# Patient Record
Sex: FEMALE | Race: WHITE | NOT HISPANIC OR LATINO | Employment: OTHER | URBAN - METROPOLITAN AREA
[De-identification: names, ages, dates, MRNs, and addresses within clinical notes are randomized per-mention and may not be internally consistent; named-entity substitution may affect disease eponyms.]

---

## 2023-06-06 DIAGNOSIS — M81.0 OSTEOPOROSIS, SENILE: Primary | ICD-10-CM

## 2023-06-07 PROBLEM — R19.5 POSITIVE COLORECTAL CANCER SCREENING USING COLOGUARD TEST: Status: ACTIVE | Noted: 2023-06-07

## 2023-06-07 PROBLEM — M81.0 OSTEOPOROSIS, SENILE: Status: ACTIVE | Noted: 2023-06-07

## 2023-06-07 PROBLEM — N39.0 UTI (URINARY TRACT INFECTION): Status: RESOLVED | Noted: 2023-06-07 | Resolved: 2023-06-07

## 2023-06-07 PROBLEM — R92.8 ABNORMAL MAMMOGRAM OF RIGHT BREAST: Status: ACTIVE | Noted: 2023-06-07

## 2023-06-07 PROBLEM — R60.0 EDEMA OF BOTH LEGS: Status: ACTIVE | Noted: 2023-06-07

## 2023-06-07 PROBLEM — F17.210 HEAVY TOBACCO SMOKER >10 CIGARETTES PER DAY: Status: ACTIVE | Noted: 2023-06-07

## 2023-06-07 PROBLEM — R31.29 MICROSCOPIC HEMATURIA: Status: ACTIVE | Noted: 2023-06-07

## 2023-06-07 RX ORDER — IBANDRONATE SODIUM 150 MG/1
TABLET, FILM COATED ORAL
Qty: 3 TABLET | Refills: 3 | Status: SHIPPED | OUTPATIENT
Start: 2023-06-07 | End: 2023-06-09

## 2023-06-09 DIAGNOSIS — M81.0 OSTEOPOROSIS, SENILE: ICD-10-CM

## 2023-06-09 RX ORDER — IBANDRONATE SODIUM 150 MG/1
TABLET, FILM COATED ORAL
Qty: 3 TABLET | Refills: 3 | Status: SHIPPED | OUTPATIENT
Start: 2023-06-09 | End: 2024-05-03 | Stop reason: SDUPTHER

## 2023-12-18 ENCOUNTER — HOSPITAL ENCOUNTER (OUTPATIENT)
Dept: RADIOLOGY | Facility: HOSPITAL | Age: 68
Discharge: HOME | End: 2023-12-18
Payer: MEDICARE

## 2023-12-18 DIAGNOSIS — J84.9 INTERSTITIAL PULMONARY DISEASE, UNSPECIFIED (MULTI): ICD-10-CM

## 2023-12-18 PROCEDURE — 71250 CT THORAX DX C-: CPT

## 2023-12-18 PROCEDURE — 71250 CT THORAX DX C-: CPT | Performed by: RADIOLOGY

## 2024-01-05 RX ORDER — NITROFURANTOIN 25; 75 MG/1; MG/1
100 CAPSULE ORAL EVERY 12 HOURS
COMMUNITY
Start: 2023-01-09

## 2024-01-05 RX ORDER — ACETAMINOPHEN 500 MG
TABLET ORAL
COMMUNITY

## 2024-01-05 RX ORDER — IBANDRONATE SODIUM 150 MG/1
TABLET, FILM COATED ORAL
COMMUNITY
Start: 2022-05-27 | End: 2024-05-03 | Stop reason: SDUPTHER

## 2024-01-08 ENCOUNTER — OFFICE VISIT (OUTPATIENT)
Dept: PULMONOLOGY | Facility: CLINIC | Age: 69
End: 2024-01-08
Payer: MEDICARE

## 2024-01-08 VITALS
DIASTOLIC BLOOD PRESSURE: 74 MMHG | RESPIRATION RATE: 16 BRPM | SYSTOLIC BLOOD PRESSURE: 121 MMHG | OXYGEN SATURATION: 99 % | HEART RATE: 68 BPM | WEIGHT: 137 LBS | BODY MASS INDEX: 25.89 KG/M2

## 2024-01-08 DIAGNOSIS — F17.210 CIGARETTE SMOKER: ICD-10-CM

## 2024-01-08 DIAGNOSIS — J84.10 PULMONARY FIBROSIS (MULTI): ICD-10-CM

## 2024-01-08 DIAGNOSIS — R91.8 LUNG NODULES: ICD-10-CM

## 2024-01-08 DIAGNOSIS — F17.200 SMOKING: Primary | ICD-10-CM

## 2024-01-08 PROCEDURE — 99214 OFFICE O/P EST MOD 30 MIN: CPT | Performed by: INTERNAL MEDICINE

## 2024-01-08 PROCEDURE — 1126F AMNT PAIN NOTED NONE PRSNT: CPT | Performed by: INTERNAL MEDICINE

## 2024-01-08 PROCEDURE — 1159F MED LIST DOCD IN RCRD: CPT | Performed by: INTERNAL MEDICINE

## 2024-01-08 RX ORDER — VARENICLINE TARTRATE 0.5 (11)-1
KIT ORAL
Qty: 53 EACH | Refills: 0 | Status: SHIPPED | OUTPATIENT
Start: 2024-01-08 | End: 2024-03-14

## 2024-01-08 ASSESSMENT — PATIENT HEALTH QUESTIONNAIRE - PHQ9
SUM OF ALL RESPONSES TO PHQ9 QUESTIONS 1 AND 2: 0
1. LITTLE INTEREST OR PLEASURE IN DOING THINGS: NOT AT ALL
2. FEELING DOWN, DEPRESSED OR HOPELESS: NOT AT ALL

## 2024-01-08 ASSESSMENT — COLUMBIA-SUICIDE SEVERITY RATING SCALE - C-SSRS
2. HAVE YOU ACTUALLY HAD ANY THOUGHTS OF KILLING YOURSELF?: NO
6. HAVE YOU EVER DONE ANYTHING, STARTED TO DO ANYTHING, OR PREPARED TO DO ANYTHING TO END YOUR LIFE?: NO

## 2024-01-08 ASSESSMENT — ENCOUNTER SYMPTOMS: DEPRESSION: 0

## 2024-01-08 ASSESSMENT — PAIN SCALES - GENERAL: PAINLEVEL: 0-NO PAIN

## 2024-01-08 NOTE — PROGRESS NOTES
Department of Medicine  Division of Pulmonary, Critical Care, and Sleep Medicine  Follow-Up Visit  [facility] - [address], [address]    Physician HPI (1/8/2024):  68-year-old woman presenting for follow-up on pulmonary fibrosis and lung nodules.  Patient was seen by Dr. Torrez in January 2023.  He has been doing well since then, no major respiratory complaints, denied any dyspnea, no cough, no wheezing or chest pain.      Immunization History   Administered Date(s) Administered    Moderna COVID-19 vaccine, bivalent, blue cap/gray label *Check age/dose* 10/17/2022    Moderna SARS-CoV-2 Vaccination 03/10/2021, 04/07/2021, 11/12/2021, 04/22/2022       Current Medications:  Current Outpatient Medications   Medication Instructions    cholecalciferol (Vitamin D-3) 50 mcg (2,000 unit) capsule oral    ibandronate (Boniva) 150 mg tablet TAKE 1 TABLET BY MOUTH ONCE MONTHLY WITH 8 TO 10 OUNCES OF WATER. STAY UPRIGHTAND  DO NOT EAT OR DRINK FOR 1  HOUR    ibandronate (Boniva) 150 mg tablet oral    nitrofurantoin, macrocrystal-monohydrate, (Macrobid) 100 mg capsule 100 mg, oral, Every 12 hours    varenicline (Chantix LANG) 0.5 mg (11)- 1 mg (42) tablet Take 0.5 mg by mouth once daily for 3 days, THEN 0.5 mg 2 times a day for 3 days, THEN 1 mg 2 times a day.        Drug Allergies/Intolerances:  No Known Allergies       Visit Vitals  /74   Pulse 68   Resp 16   Wt 62.1 kg (137 lb)   SpO2 99%   BMI 25.89 kg/m²   Smoking Status Every Day   BSA 1.63 m²        Physical exam  Constitutional: Normal appearance.  HEENT: Normocephalic and atraumatic.  Cardiovascular: Normal rate and regular rhythm.  Pulmonary: Normal respiratory effort, bilateral clear breath sounds, no wheezing or rhonchi.  Musculoskeletal: No edema, no cyanosis.  Neurological: Awake, alert and oriented x3.  Psychiatric: Normal behavior, mood and affect.      Pulmonary Function Test Results     Pulmonary Functions Testing Results:    No results  "found for: \"FEV1\", \"FVC\", \"FMG6ZLQ\", \"TLC\", \"DLCO\"      Chest Radiograph     No results found for this or any previous visit from the past 2000 days.      Echocardiogram     No results found for this or any previous visit from the past 365 days.       Chest CT Scan     No results found for this or any previous visit from the past 365 days.       Laboratory Studies     Lab Results   Component Value Date    WBC 9.9 05/09/2022    HGB 13.9 05/09/2022    HCT 44.2 05/09/2022    MCV 98 05/09/2022     05/09/2022      Lab Results   Component Value Date    GLUCOSE 83 05/09/2022    CALCIUM 9.1 05/09/2022     05/09/2022    K 4.8 05/09/2022    CO2 27 05/09/2022     05/09/2022    BUN 11 05/09/2022    CREATININE 0.76 05/09/2022      Lab Results   Component Value Date    ALT 13 05/09/2022    AST 19 05/09/2022    ALKPHOS 108 05/09/2022    BILITOT 0.4 05/09/2022        Sputum Culture     No results found for: \"AFBCX\"   No results found for: \"RESPCULTCYFI\"  No results found for the last 90 days.          Assessment and Plan / Recommendations     68-year-old woman presenting for follow-up on pulmonary fibrosis and lung nodules.  1.  Lung nodules-small and stable on 1 year follow-up CT from December 2023, likely benign.  -Low-dose CT screening 1 year (history of more than 20-pack-year, active smoker).    2.  Pulmonary fibrosis likely CPFE, seems to be stable on CT from December, asymptomatic currently  -Will obtain PFT for baseline  -Strongly encouraged to quit smoking, will order varenicline to assist with smoking cessation    Return to clinic in 1 year or sooner with any concerns.    This dictation was created using voice recognition software. Phonetic and/or minor grammatical errors may exist.        Lyssa Calle MD   01/08/2024    "

## 2024-05-02 DIAGNOSIS — M81.0 OSTEOPOROSIS, SENILE: ICD-10-CM

## 2024-05-03 RX ORDER — IBANDRONATE SODIUM 150 MG/1
150 TABLET, FILM COATED ORAL
Qty: 3 TABLET | Refills: 3 | Status: SHIPPED | OUTPATIENT
Start: 2024-05-03

## 2025-01-03 ENCOUNTER — HOSPITAL ENCOUNTER (OUTPATIENT)
Dept: RADIOLOGY | Facility: HOSPITAL | Age: 70
Discharge: HOME | End: 2025-01-03
Payer: MEDICARE

## 2025-01-03 DIAGNOSIS — F17.200 SMOKING: ICD-10-CM

## 2025-01-03 DIAGNOSIS — F17.210 CIGARETTE SMOKER: ICD-10-CM

## 2025-01-03 PROCEDURE — 71271 CT THORAX LUNG CANCER SCR C-: CPT

## 2025-01-08 ENCOUNTER — OFFICE VISIT (OUTPATIENT)
Dept: PULMONOLOGY | Facility: CLINIC | Age: 70
End: 2025-01-08
Payer: MEDICARE

## 2025-01-08 VITALS
WEIGHT: 135 LBS | RESPIRATION RATE: 16 BRPM | OXYGEN SATURATION: 97 % | DIASTOLIC BLOOD PRESSURE: 81 MMHG | HEART RATE: 102 BPM | SYSTOLIC BLOOD PRESSURE: 127 MMHG | BODY MASS INDEX: 25.51 KG/M2

## 2025-01-08 DIAGNOSIS — F17.210 CIGARETTE SMOKER: ICD-10-CM

## 2025-01-08 DIAGNOSIS — R91.8 LUNG NODULES: ICD-10-CM

## 2025-01-08 DIAGNOSIS — F17.200 NICOTINE DEPENDENCE, UNCOMPLICATED, UNSPECIFIED NICOTINE PRODUCT TYPE: Primary | ICD-10-CM

## 2025-01-08 DIAGNOSIS — J84.10 PULMONARY FIBROSIS (MULTI): ICD-10-CM

## 2025-01-08 PROCEDURE — 99214 OFFICE O/P EST MOD 30 MIN: CPT | Performed by: INTERNAL MEDICINE

## 2025-01-08 PROCEDURE — 1126F AMNT PAIN NOTED NONE PRSNT: CPT | Performed by: INTERNAL MEDICINE

## 2025-01-08 PROCEDURE — 1159F MED LIST DOCD IN RCRD: CPT | Performed by: INTERNAL MEDICINE

## 2025-01-08 RX ORDER — VARENICLINE TARTRATE 0.5 MG/1
0.5 TABLET, FILM COATED ORAL 2 TIMES DAILY
Qty: 60 TABLET | Refills: 3 | Status: SHIPPED | OUTPATIENT
Start: 2025-01-08 | End: 2025-05-08

## 2025-01-08 ASSESSMENT — PATIENT HEALTH QUESTIONNAIRE - PHQ9
2. FEELING DOWN, DEPRESSED OR HOPELESS: NOT AT ALL
SUM OF ALL RESPONSES TO PHQ9 QUESTIONS 1 AND 2: 0
1. LITTLE INTEREST OR PLEASURE IN DOING THINGS: NOT AT ALL

## 2025-01-08 ASSESSMENT — COLUMBIA-SUICIDE SEVERITY RATING SCALE - C-SSRS
6. HAVE YOU EVER DONE ANYTHING, STARTED TO DO ANYTHING, OR PREPARED TO DO ANYTHING TO END YOUR LIFE?: NO
1. IN THE PAST MONTH, HAVE YOU WISHED YOU WERE DEAD OR WISHED YOU COULD GO TO SLEEP AND NOT WAKE UP?: NO
2. HAVE YOU ACTUALLY HAD ANY THOUGHTS OF KILLING YOURSELF?: NO

## 2025-01-08 ASSESSMENT — ENCOUNTER SYMPTOMS: DEPRESSION: 0

## 2025-01-08 ASSESSMENT — PAIN SCALES - GENERAL: PAINLEVEL_OUTOF10: 0-NO PAIN

## 2025-01-08 NOTE — PROGRESS NOTES
"  Department of Medicine  Division of Pulmonary, Critical Care, and Sleep Medicine  Follow-Up Visit  [facility] - [address], [address]    Physician HPI (1/8/2025):  69-year-old woman presenting for follow-up on pulmonary fibrosis and lung nodules.  No changes since last visit, denied any new or worsening respiratory complaints.  Still smoking 1 pack/day, was able to quit using Chantix but had side effects when there was increased to 1 mg so she stopped using it.      Immunization History   Administered Date(s) Administered    Moderna COVID-19 vaccine, bivalent, blue cap/gray label *Check age/dose* 10/17/2022       Current Medications:  Current Outpatient Medications   Medication Instructions    cholecalciferol (Vitamin D-3) 50 mcg (2,000 unit) capsule Take by mouth.    ibandronate (BONIVA) 150 mg, oral, Every 30 days, WITH 8 TO 10 OUNCES OF WATER STAY UPRIGHT AND DO NOT EAT OR DRINK FOR 1 HOUR    nitrofurantoin, macrocrystal-monohydrate, (Macrobid) 100 mg capsule 100 mg, oral, Every 12 hours    varenicline (Chantix LANG) 0.5 mg (11)- 1 mg (42) tablet Take 0.5 mg by mouth once daily for 3 days, THEN 0.5 mg 2 times a day for 3 days, THEN 1 mg 2 times a day.        Drug Allergies/Intolerances:  No Known Allergies       Visit Vitals  /81   Pulse 102   Resp 16   Wt 61.2 kg (135 lb)   SpO2 97%   BMI 25.51 kg/m²   Smoking Status Every Day   BSA 1.62 m²        Physical exam  Constitutional: Normal appearance.  HEENT: Normocephalic and atraumatic.  Cardiovascular: Normal rate and regular rhythm.  Pulmonary: Normal respiratory effort, bilateral clear breath sounds, no wheezing or rhonchi.  Musculoskeletal: No edema, no cyanosis.  Neurological: Awake, alert and oriented x3.  Psychiatric: Normal behavior, mood and affect.      Pulmonary Function Test Results     Pulmonary Functions Testing Results:    No results found for: \"FEV1\", \"FVC\", \"[id number]\", \"TLC\", \"DLCO\"      Chest Radiograph     No results found " "for this or any previous visit from the past 2000 days.      Echocardiogram     No results found for this or any previous visit from the past 365 days.       Chest CT Scan     No results found for this or any previous visit from the past 365 days.       Laboratory Studies     Lab Results   Component Value Date    WBC 9.9 05/09/2022    HGB 13.9 05/09/2022    HCT 44.2 05/09/2022    MCV 98 05/09/2022     05/09/2022      Lab Results   Component Value Date    GLUCOSE 83 05/09/2022    CALCIUM 9.1 05/09/2022     05/09/2022    K 4.8 05/09/2022    CO2 27 05/09/2022     05/09/2022    BUN 11 05/09/2022    CREATININE 0.76 05/09/2022      Lab Results   Component Value Date    ALT 13 05/09/2022    AST 19 05/09/2022    ALKPHOS 108 05/09/2022    BILITOT 0.4 05/09/2022        Sputum Culture     No results found for: \"AFBCX\"   No results found for: \"RESPCULTCYFI\"  No results found for the last 90 days.          Assessment and Plan / Recommendations     69-year-old woman presenting for follow-up on pulmonary fibrosis and lung nodules.  1.  Lung nodules-stable, likely benign.  -Low-dose CT screening 1 year (active smoker 1 pack/day with more than 20-pack-year history)    2.  Pulmonary fibrosis likely CPFE, seems to be stable on CT from January 2025.  Asymptomatic.  Strongly encouraged to quit smoking, we will order Chantix 0.5 twice daily (she was able to quit using Chantix in the past but had to stop it when it was increased to 1 due to side effects)  Return to clinic in 1 year or sooner with any concerns.    This dictation was created using voice recognition software. Phonetic and/or minor grammatical errors may exist.        Lyssa Calle MD   01/08/2025    "

## 2025-03-13 DIAGNOSIS — M81.0 OSTEOPOROSIS, SENILE: ICD-10-CM

## 2025-03-14 RX ORDER — IBANDRONATE SODIUM 150 MG/1
150 TABLET, FILM COATED ORAL
Qty: 3 TABLET | Refills: 3 | Status: SHIPPED | OUTPATIENT
Start: 2025-03-14